# Patient Record
Sex: FEMALE | Race: WHITE | HISPANIC OR LATINO | URBAN - METROPOLITAN AREA
[De-identification: names, ages, dates, MRNs, and addresses within clinical notes are randomized per-mention and may not be internally consistent; named-entity substitution may affect disease eponyms.]

---

## 2024-11-01 ENCOUNTER — LAB OUTSIDE AN ENCOUNTER (OUTPATIENT)
Dept: URBAN - METROPOLITAN AREA CLINIC 98 | Facility: CLINIC | Age: 69
End: 2024-11-01

## 2024-11-01 ENCOUNTER — OFFICE VISIT (OUTPATIENT)
Dept: URBAN - METROPOLITAN AREA CLINIC 98 | Facility: CLINIC | Age: 69
End: 2024-11-01
Payer: MEDICARE

## 2024-11-01 VITALS
WEIGHT: 234.6 LBS | DIASTOLIC BLOOD PRESSURE: 69 MMHG | TEMPERATURE: 97.2 F | BODY MASS INDEX: 40.05 KG/M2 | HEART RATE: 83 BPM | HEIGHT: 64 IN | SYSTOLIC BLOOD PRESSURE: 86 MMHG

## 2024-11-01 DIAGNOSIS — R14.0 ABDOMINAL BLOATING: ICD-10-CM

## 2024-11-01 DIAGNOSIS — K57.92 ACUTE DIVERTICULITIS: ICD-10-CM

## 2024-11-01 DIAGNOSIS — K59.04 CHRONIC IDIOPATHIC CONSTIPATION: ICD-10-CM

## 2024-11-01 PROBLEM — 82934008: Status: ACTIVE | Noted: 2024-11-01

## 2024-11-01 PROCEDURE — 99214 OFFICE O/P EST MOD 30 MIN: CPT | Performed by: INTERNAL MEDICINE

## 2024-11-01 RX ORDER — HYOSCYAMINE SULFATE 0.12 MG/1
1 TABLET AS NEEDED TABLET ORAL
Qty: 40 | Refills: 2 | Status: ACTIVE | COMMUNITY

## 2024-11-01 RX ORDER — ATORVASTATIN CALCIUM 20 MG/1
TABLET, FILM COATED ORAL
Qty: 90 TABLET | Status: ACTIVE | COMMUNITY

## 2024-11-01 RX ORDER — BACLOFEN 10 MG/1
TABLET ORAL
Qty: 60 TABLET | Status: ACTIVE | COMMUNITY

## 2024-11-01 RX ORDER — SODIUM, POTASSIUM,MAG SULFATES 17.5-3.13G
360ML SOLUTION, RECONSTITUTED, ORAL ORAL
Qty: 1 | Refills: 0 | Status: ON HOLD | COMMUNITY
Start: 2020-06-11

## 2024-11-01 NOTE — HPI-TODAY'S VISIT:
Ms. Jagruti العلي is a 68 yo F ([place]enian-speaking) presenting for followup for abdominal pain.  Last visit on 9/6/24 She has been having some pain in her lower left quadrant for 1 week.  Pain is moderate. Thinks she is having another diverticulitis flare.  She is having 1 BM per day, straining more recently.  Still needing colace.  No blood in stools.  No fever.  She brings an  today with her   I reviewed:  10/7/24 colonoscopy: diverticulosis, repeat in 5 years 8/16/23 CBC: normal 8/23/24 CT A/P: mild diverticulitis, lung nodule (repeat CT in 12 months), bladder thickening 7/10/2020 EGD 7/10/2020 colonoscopy: diverticulosis, hemorrhoids s/p banding

## 2024-11-01 NOTE — EXAM-FUNCTIONAL ASSESSMENT
Constitutional: well-developed, normal communication ability.   Eyes: Conjunctivae and eyelids appear normal, no scleral icterus. Respiratory: symmetric expansion of chest wall, normal work of breathing   Gastrointestinal:  normoactive bowel sounds, soft, lower left quadrant abdominal tenderness, no rebound tenderness, no shifting dullness, no organomegaly.   Musculoskeletal: normal gait and station   Skin: no jaundice   Neurologic: Oriented to person, place, time. Short term memory intact.    Psychiatric: Normal mood and appropriate affect.

## 2024-11-05 ENCOUNTER — TELEPHONE ENCOUNTER (OUTPATIENT)
Dept: URBAN - METROPOLITAN AREA CLINIC 98 | Facility: CLINIC | Age: 69
End: 2024-11-05

## 2024-11-05 RX ORDER — LINACLOTIDE 72 UG/1
1 CAPSULE AT LEAST 30 MINUTES BEFORE THE FIRST MEAL OF THE DAY ON AN EMPTY STOMACH CAPSULE, GELATIN COATED ORAL ONCE A DAY
Qty: 30 | OUTPATIENT
Start: 2024-11-06 | End: 2024-12-06

## 2024-11-06 ENCOUNTER — TELEPHONE ENCOUNTER (OUTPATIENT)
Dept: URBAN - METROPOLITAN AREA CLINIC 98 | Facility: CLINIC | Age: 69
End: 2024-11-06

## 2024-11-06 LAB
BASO (ABSOLUTE): 0
BASOS: 0
EOS (ABSOLUTE): 0.1
EOS: 2
HEMATOCRIT: 38.3
HEMATOLOGY COMMENTS:: (no result)
HEMOGLOBIN: 12.3
IMMATURE CELLS: (no result)
IMMATURE GRANS (ABS): 0
IMMATURE GRANULOCYTES: 1
LYMPHS (ABSOLUTE): 2.3
LYMPHS: 31
MCH: 28.1
MCHC: 32.1
MCV: 87
MONOCYTES(ABSOLUTE): 0.5
MONOCYTES: 6
NEUTROPHILS (ABSOLUTE): 4.4
NEUTROPHILS: 60
NRBC: (no result)
PLATELETS: 178
RBC: 4.38
RDW: 13.8
WBC: 7.3

## 2024-11-06 RX ORDER — LINACLOTIDE 72 UG/1
1 CAPSULE AT LEAST 30 MINUTES BEFORE THE FIRST MEAL OF THE DAY ON AN EMPTY STOMACH CAPSULE, GELATIN COATED ORAL ONCE A DAY
Qty: 30 | Refills: 3 | OUTPATIENT
Start: 2024-11-06 | End: 2025-03-06

## 2024-11-12 ENCOUNTER — TELEPHONE ENCOUNTER (OUTPATIENT)
Dept: URBAN - METROPOLITAN AREA CLINIC 98 | Facility: CLINIC | Age: 69
End: 2024-11-12

## 2024-11-18 ENCOUNTER — TELEPHONE ENCOUNTER (OUTPATIENT)
Dept: URBAN - METROPOLITAN AREA CLINIC 96 | Facility: CLINIC | Age: 69
End: 2024-11-18

## 2024-11-18 RX ORDER — METRONIDAZOLE 500 MG/1
1 TABLET TABLET ORAL EVERY 8 HOURS
Qty: 30 TABLET | Refills: 0 | OUTPATIENT
Start: 2024-11-18 | End: 2024-11-28

## 2024-11-18 RX ORDER — CIPROFLOXACIN 500 MG/1
1 TABLET TABLET, FILM COATED ORAL
Qty: 20 TABLET | Refills: 0 | OUTPATIENT
Start: 2024-11-18 | End: 2024-11-28

## 2025-01-03 ENCOUNTER — LAB OUTSIDE AN ENCOUNTER (OUTPATIENT)
Dept: URBAN - METROPOLITAN AREA CLINIC 98 | Facility: CLINIC | Age: 70
End: 2025-01-03

## 2025-01-03 ENCOUNTER — OFFICE VISIT (OUTPATIENT)
Dept: URBAN - METROPOLITAN AREA CLINIC 98 | Facility: CLINIC | Age: 70
End: 2025-01-03
Payer: MEDICARE

## 2025-01-03 VITALS
HEART RATE: 81 BPM | DIASTOLIC BLOOD PRESSURE: 74 MMHG | WEIGHT: 227.4 LBS | HEIGHT: 64 IN | BODY MASS INDEX: 38.82 KG/M2 | TEMPERATURE: 97 F | SYSTOLIC BLOOD PRESSURE: 121 MMHG

## 2025-01-03 DIAGNOSIS — K57.92 ACUTE DIVERTICULITIS: ICD-10-CM

## 2025-01-03 DIAGNOSIS — R10.32 LEFT LOWER QUADRANT ABDOMINAL PAIN: ICD-10-CM

## 2025-01-03 DIAGNOSIS — K59.04 CHRONIC IDIOPATHIC CONSTIPATION: ICD-10-CM

## 2025-01-03 PROCEDURE — 99214 OFFICE O/P EST MOD 30 MIN: CPT | Performed by: INTERNAL MEDICINE

## 2025-01-03 RX ORDER — HYOSCYAMINE SULFATE 0.12 MG/1
1 TABLET AS NEEDED TABLET ORAL
Qty: 40 | Refills: 2 | Status: ACTIVE | COMMUNITY

## 2025-01-03 RX ORDER — BACLOFEN 10 MG/1
TABLET ORAL
Qty: 60 TABLET | Status: ACTIVE | COMMUNITY

## 2025-01-03 RX ORDER — ATORVASTATIN CALCIUM 20 MG/1
TABLET, FILM COATED ORAL
Qty: 90 TABLET | Status: ACTIVE | COMMUNITY

## 2025-01-03 RX ORDER — LINACLOTIDE 72 UG/1
1 CAPSULE AT LEAST 30 MINUTES BEFORE THE FIRST MEAL OF THE DAY ON AN EMPTY STOMACH CAPSULE, GELATIN COATED ORAL ONCE A DAY
Qty: 30 | Refills: 3 | Status: ACTIVE | COMMUNITY
Start: 2024-11-06 | End: 2025-03-06

## 2025-01-03 RX ORDER — SODIUM, POTASSIUM,MAG SULFATES 17.5-3.13G
360ML SOLUTION, RECONSTITUTED, ORAL ORAL
Qty: 1 | Refills: 0 | Status: ON HOLD | COMMUNITY
Start: 2020-06-11

## 2025-01-03 RX ORDER — LINACLOTIDE 72 UG/1
1 CAPSULE AT LEAST 30 MINUTES BEFORE THE FIRST MEAL OF THE DAY ON AN EMPTY STOMACH CAPSULE, GELATIN COATED ORAL ONCE A DAY
Qty: 30 | Refills: 3 | OUTPATIENT

## 2025-01-03 NOTE — HPI-TODAY'S VISIT:
Ms. Jagruti العلي is a 70 yo F ([place]ese-speaking) presenting for followup for abdominal pain.  Last visit on 11/1/24.   I sent the flagyl/ciprofloxacin to her pharmacy for the second time on 11/18/24 for her acute descending colon diverticultis.  Patient and her daughter did not  the antibiotics in November. They are not sure why.   Pain is still present and now she has some hip pain on her left side and when she lays on her left side. Present for a few days. Worse when she lays on her left side.  No fever.  No nausea or vomiting.   Having bowel movements intermittently, last 2 days ago. Takes Linzess 72 mcg with improvement in bowel movements.   She brings an  today with her   I reviewed:  11/5/24 CBC: normal 11/8/24 CT A/P: jejunal diverticulum, descending colon acute divertiuclitis 10/7/24 colonoscopy: diverticulosis, repeat in 5 years 8/16/23 CBC: normal 8/23/24 CT A/P: mild diverticulitis, lung nodule (repeat CT in 12 months), bladder thickening 7/10/2020 EGD 7/10/2020 colonoscopy: diverticulosis, hemorrhoids s/p banding

## 2025-01-07 LAB
BASO (ABSOLUTE): 0
BASOS: 0
EOS (ABSOLUTE): 0.1
EOS: 2
HEMATOCRIT: 37.9
HEMATOLOGY COMMENTS:: (no result)
HEMOGLOBIN: 12.1
IMMATURE CELLS: (no result)
IMMATURE GRANS (ABS): 0
IMMATURE GRANULOCYTES: 0
LYMPHS (ABSOLUTE): 2.6
LYMPHS: 36
MCH: 28.6
MCHC: 31.9
MCV: 90
MONOCYTES(ABSOLUTE): 0.4
MONOCYTES: 6
NEUTROPHILS (ABSOLUTE): 4.1
NEUTROPHILS: 56
NRBC: (no result)
PLATELETS: 191
RBC: 4.23
RDW: 14.8
WBC: 7.3

## 2025-02-07 ENCOUNTER — OFFICE VISIT (OUTPATIENT)
Dept: URBAN - METROPOLITAN AREA CLINIC 98 | Facility: CLINIC | Age: 70
End: 2025-02-07
Payer: MEDICARE

## 2025-02-07 VITALS
HEIGHT: 64 IN | WEIGHT: 226.4 LBS | HEART RATE: 90 BPM | SYSTOLIC BLOOD PRESSURE: 111 MMHG | TEMPERATURE: 97.3 F | DIASTOLIC BLOOD PRESSURE: 64 MMHG | BODY MASS INDEX: 38.65 KG/M2

## 2025-02-07 DIAGNOSIS — K57.92 ACUTE DIVERTICULITIS: ICD-10-CM

## 2025-02-07 DIAGNOSIS — R10.32 LEFT LOWER QUADRANT ABDOMINAL PAIN: ICD-10-CM

## 2025-02-07 DIAGNOSIS — K59.04 CHRONIC IDIOPATHIC CONSTIPATION: ICD-10-CM

## 2025-02-07 PROCEDURE — 99214 OFFICE O/P EST MOD 30 MIN: CPT | Performed by: INTERNAL MEDICINE

## 2025-02-07 RX ORDER — HYOSCYAMINE SULFATE 0.12 MG/1
1 TABLET AS NEEDED TABLET ORAL
Qty: 40 | Refills: 2 | Status: ACTIVE | COMMUNITY

## 2025-02-07 RX ORDER — LINACLOTIDE 72 UG/1
1 CAPSULE AT LEAST 30 MINUTES BEFORE THE FIRST MEAL OF THE DAY ON AN EMPTY STOMACH CAPSULE, GELATIN COATED ORAL ONCE A DAY
Qty: 30 | Refills: 5 | OUTPATIENT

## 2025-02-07 RX ORDER — LINACLOTIDE 72 UG/1
1 CAPSULE AT LEAST 30 MINUTES BEFORE THE FIRST MEAL OF THE DAY ON AN EMPTY STOMACH CAPSULE, GELATIN COATED ORAL ONCE A DAY
Qty: 30 | Refills: 3 | Status: ACTIVE | COMMUNITY

## 2025-02-07 RX ORDER — SODIUM, POTASSIUM,MAG SULFATES 17.5-3.13G
360ML SOLUTION, RECONSTITUTED, ORAL ORAL
Qty: 1 | Refills: 0 | Status: ON HOLD | COMMUNITY
Start: 2020-06-11

## 2025-02-07 RX ORDER — ATORVASTATIN CALCIUM 20 MG/1
TABLET, FILM COATED ORAL
Qty: 90 TABLET | Status: ACTIVE | COMMUNITY

## 2025-02-07 RX ORDER — BACLOFEN 10 MG/1
TABLET ORAL
Qty: 60 TABLET | Status: ACTIVE | COMMUNITY

## 2025-02-07 NOTE — HPI-TODAY'S VISIT:
Ms. Jagruti العلي is a 68 yo F (Syrian-speaking) presenting for followup for abdominal pain.  Last visit on 1/3/25- CT and CBC without diverticulitis  Not having the same pain anymore. Has some mild cramping in her lower abdomen but says she ate some spicy foods and thinks that caused it.  No fever or vomiting.  Having daily bowel movements without blood. Taking LInzess 72 mcg occasionally.  Stable weight.   she occasionally has left hip pain when walking. She brings an  today with her   I reviewed:  1/14/25 CT A/P: possible sludge and stones in gallbladder, diverticulosis without inflammation 1/6/25 CBC: normal 11/5/24 CBC: normal 11/8/24 CT A/P: jejunal diverticulum, descending colon acute divertiuclitis 10/7/24 colonoscopy: diverticulosis, repeat in 5 years 8/16/23 CBC: normal 8/23/24 CT A/P: mild diverticulitis, lung nodule (repeat CT in 12 months), bladder thickening 7/10/2020 EGD 7/10/2020 colonoscopy: diverticulosis, hemorrhoids s/p banding

## 2025-08-08 ENCOUNTER — OFFICE VISIT (OUTPATIENT)
Dept: URBAN - METROPOLITAN AREA CLINIC 98 | Facility: CLINIC | Age: 70
End: 2025-08-08
Payer: MEDICARE

## 2025-08-08 DIAGNOSIS — R10.84 ABDOMINAL CRAMPING, GENERALIZED: ICD-10-CM

## 2025-08-08 DIAGNOSIS — K59.04 CHRONIC IDIOPATHIC CONSTIPATION: ICD-10-CM

## 2025-08-08 PROCEDURE — 99214 OFFICE O/P EST MOD 30 MIN: CPT | Performed by: INTERNAL MEDICINE

## 2025-08-08 RX ORDER — LINACLOTIDE 72 UG/1
1 CAPSULE AT LEAST 30 MINUTES BEFORE THE FIRST MEAL OF THE DAY ON AN EMPTY STOMACH CAPSULE, GELATIN COATED ORAL ONCE A DAY
Qty: 30 | Refills: 5 | Status: ACTIVE | COMMUNITY

## 2025-08-08 RX ORDER — LINACLOTIDE 145 UG/1
1 CAPSULE AT LEAST 30 MINUTES BEFORE THE FIRST MEAL OF THE DAY ON AN EMPTY STOMACH CAPSULE, GELATIN COATED ORAL ONCE A DAY
Qty: 30 | Refills: 3 | OUTPATIENT
Start: 2025-08-08 | End: 2025-12-06

## 2025-08-08 RX ORDER — LINACLOTIDE 72 UG/1
1 CAPSULE AT LEAST 30 MINUTES BEFORE THE FIRST MEAL OF THE DAY ON AN EMPTY STOMACH CAPSULE, GELATIN COATED ORAL ONCE A DAY
OUTPATIENT

## 2025-08-08 RX ORDER — SODIUM, POTASSIUM,MAG SULFATES 17.5-3.13G
360ML SOLUTION, RECONSTITUTED, ORAL ORAL
Qty: 1 | Refills: 0 | Status: ON HOLD | COMMUNITY
Start: 2020-06-11

## 2025-08-08 RX ORDER — HYOSCYAMINE SULFATE 0.12 MG/1
1 TABLET AS NEEDED TABLET ORAL
Qty: 40 | Refills: 2 | Status: ACTIVE | COMMUNITY

## 2025-08-08 RX ORDER — ATORVASTATIN CALCIUM 20 MG/1
TABLET, FILM COATED ORAL
Qty: 90 TABLET | Status: ACTIVE | COMMUNITY

## 2025-08-08 RX ORDER — BACLOFEN 10 MG/1
TABLET ORAL
Qty: 60 TABLET | Status: ACTIVE | COMMUNITY